# Patient Record
Sex: MALE | URBAN - METROPOLITAN AREA
[De-identification: names, ages, dates, MRNs, and addresses within clinical notes are randomized per-mention and may not be internally consistent; named-entity substitution may affect disease eponyms.]

---

## 2020-11-30 ENCOUNTER — EMERGENCY (EMERGENCY)
Facility: HOSPITAL | Age: 55
LOS: 0 days | Discharge: HOME | End: 2020-12-01
Attending: STUDENT IN AN ORGANIZED HEALTH CARE EDUCATION/TRAINING PROGRAM
Payer: SELF-PAY

## 2020-11-30 VITALS — HEIGHT: 68 IN | WEIGHT: 187.39 LBS

## 2020-11-30 DIAGNOSIS — I46.9 CARDIAC ARREST, CAUSE UNSPECIFIED: ICD-10-CM

## 2020-11-30 DIAGNOSIS — Z20.828 CONTACT WITH AND (SUSPECTED) EXPOSURE TO OTHER VIRAL COMMUNICABLE DISEASES: ICD-10-CM
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
## 2020-11-30 PROCEDURE — 99285 EMERGENCY DEPT VISIT HI MDM: CPT | Mod: 25

## 2020-11-30 PROCEDURE — 99053 MED SERV 10PM-8AM 24 HR FAC: CPT

## 2020-11-30 PROCEDURE — 32551 INSERTION OF CHEST TUBE: CPT

## 2020-11-30 PROCEDURE — 92950 HEART/LUNG RESUSCITATION CPR: CPT | Mod: 59
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
## 2020-11-30 NOTE — ED PROVIDER NOTE - PHYSICAL EXAMINATION
CONSTITUTIONAL: unresponsive.   SKIN: cool, clammy.   HEAD: Normocephalic; no skull indentations, no contusions or lacerations  EYES: no gross trauma bilaterally, no proptosis  ENT: +intubated; no septal hematoma or hemotypanum; no racoon eyes no martinez sign  NECK: no gross deformity; trachea midline.  CHEST: no bruising. no gross chest wall deformity.   CARD: pulseless. no JVD.   RESP: b/l breath sounds.   ABD: distended  PELVIS: no laxity with lateral compression  EXT: no gross extremity injury  NEURO: gcs 3.   PSYCH: Cooperative, appropriate CONSTITUTIONAL: unresponsive.   SKIN: cool, clammy.   HEAD: Normocephalic; no skull indentations, no contusions or lacerations  EYES: no gross trauma bilaterally, no proptosis  ENT: +intubated; no septal hematoma or hemotypanum; no racoon eyes no martinez sign  NECK: no gross deformity; trachea midline.  CHEST: no bruising. no gross chest wall deformity.   CARD: pulseless. no JVD.   RESP: b/l breath sounds.   ABD: distended  PELVIS: no laxity with lateral compression  EXT: no gross extremity injury  NEURO: gcs 3T.   PSYCH: unresponsive
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
## 2020-12-01 LAB — SARS-COV-2 RNA SPEC QL NAA+PROBE: ABNORMAL
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
## 2020-12-01 NOTE — PROCEDURE NOTE - NSPROCDETAILS_GEN_ALL_CORE
Trendelenburg position/ultrasound assessment of fluid (location)/dressing applied/supine position/percutaneous/thoracostomy tube placed percutaneously/Seldinger technique/ultrasound assessment of fluid (size)
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
## 2023-05-15 NOTE — ED ADULT TRIAGE NOTE - MEANS OF ARRIVAL
stretcher Enbrel Counseling:  I discussed with the patient the risks of etanercept including but not limited to myelosuppression, immunosuppression, autoimmune hepatitis, demyelinating diseases, lymphoma, and infections.  The patient understands that monitoring is required including a PPD at baseline and must alert us or the primary physician if symptoms of infection or other concerning signs are noted.